# Patient Record
(demographics unavailable — no encounter records)

---

## 2024-11-12 NOTE — PLAN
[FreeTextEntry1] : 1. HLD - on atorvastatin 40mg  - lipid panel ordered - c/w low cholesterol diet  2. CAD - s/p stent - on ASA, Plavix, and statin - on metoprolol - Follows with cardiology, Dr. Asencio  3. GERD - well controlled off medication now - now off famotidine  4. Upper Airway Cough Syndrome - on Azelastine and fluticasone -- has noticed improvement - Follows with pulmonology, Dr. Dumont  4. 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 Hx of Headaches and Memory Loss - pt reports work injury causing headaches and memory loss -- she states she follows with a neurologist and has been referred to cognitive therapy   6. Prediabetes - A1c 5.9 - repeat A1c ordered - recommended low card diet and regular exercise  3 month f/u.
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
## 2024-12-02 NOTE — PHYSICAL EXAM
[No Acute Distress] : no acute distress [Normal Oropharynx] : normal oropharynx [III] : Mallampati Class: III [Normal Appearance] : normal appearance [Normal Rate/Rhythm] : normal rate/rhythm [Normal S1, S2] : normal s1, s2 [No Resp Distress] : no resp distress [Clear to Auscultation Bilaterally] : clear to auscultation bilaterally [No Abnormalities] : no abnormalities [Normal Gait] : normal gait [No Clubbing] : no clubbing [No Edema] : no edema [Normal Color/ Pigmentation] : normal color/ pigmentation [No Focal Deficits] : no focal deficits [Oriented x3] : oriented x3 [Normal Affect] : normal affect [TextBox_11] : erythematous nasal turbinates b/l

## 2024-12-04 NOTE — REVIEW OF SYSTEMS
[Postnasal Drip] : postnasal drip [Seasonal Allergies] : seasonal allergies [Negative] : Endocrine [Cough] : no cough [Hemoptysis] : no hemoptysis [Chest Tightness] : no chest tightness [Frequent URIs] : no frequent URIs [Sputum] : no sputum [Dyspnea] : no dyspnea [Pleuritic Pain] : no pleuritic pain [Wheezing] : no wheezing [A.M. Dry Mouth] : no a.m. dry mouth [SOB on Exertion] : no sob on exertion [TextBox_30] : interval resolution of cough

## 2024-12-04 NOTE — HISTORY OF PRESENT ILLNESS
[Never] : never [TextBox_4] : ~age~yo woman never smoker w/ PMH GERD, HLD, CAD s/p stent presenting to establish care with complaint of chronic cough for last 3-4 years.  Has had cough for years on and off, sometimes more frequent than other days but doesn't truly go away. Sometimes productive of clear mucus, but mostly dry. Cannot pinpoint inciting event when it started. Gets worse when she has a cold, i.e. during a flu illness several months ago. Also notices it sometimes more frequent at night or in the morning when lying down. Sometimes feels a tickle/scratch in back of throat before coughing. Never had hemoptysis and denies associated dyspnea, GOULD, or wheezing. No personal or FHx asthma or other lung disease. Isn't sure if there's a relationship to allergies but does not deny allergies in general. Also endorses hx of reflux that is intermittent and had endoscopy years ago showing gastritis. No known inhalational exposures. No pets at home. Denies dust or mold in the home. Does notice she doesn't cough when visiting her native country [facility] only for it to return when she gets back to [place]. Had a CXR done earlier in the month.   SH: Never smoker or vaper Emigrated from [place] in 1997  [8/28/24] Here for 3mo f/u. Said she noticed some improvement when she tried the nasal sprays but then forgot to use them consistently, let alone daily twice a day, as instructed. Says she has a lot of forgetfulness and admits she just forgot how she was supposed to use them. Did notice benefit from famotidine tablets as well. Said the cough got better when she first tried the nasal sprays but cough returned and she hadn't used them again. Would like to re-try these meds and needs refill. Also saw ID and being treated for LTBI.  [12/4/24] Scheduled interval f/u. Doing very well today, says the cough went away after using both nasal sprays. When she was in [facility] she had no symptoms and didn't need the sprays. Upon returning to [place] she had to start using the sprays again. Needs refills. Said she needs to follow-up with the infectious disease MD regarding LTBI treatment but lost the card with phone number. No new complaints otherwise.

## 2024-12-04 NOTE — ASSESSMENT
[FreeTextEntry1] : ~age~yo woman never smoker w/ PMH GERD, HLD, CAD s/p stent, LTBI, presented to [facility] care with complaint of chronic cough for last 3-4 years, possible UACS/PND with ?reflux component, here for follow up. Symptoms resolved when using both nasal sprays as directed.   Data Reviewed: PMD charts (Dr. Gonzalez) Medication reconciliation  Impression: #Upper airway cough syndrome (UACS) #PND #Acid reflux #LTBI  UACS highly likely as her symptoms resolve with use of both nasal sprays. There is certainly an environmental component as she was asymtpomatic when traveling in [facility] the last few months and didn't need the sprays -- only for sx to return when she came back to [place]. Doubt these symptoms are related to LTBI and CXR did not show cavity in June.   Plan: - cont fluticasone INCS 1 spray both nostrils BID when symptomatic; rx renewed - cont azelastine 1% nasal spray both nostrils BID when symptomatic; rx renewed - hold off additional famotidine at this time after trial of H2B tx earlier this year - mgmt of LTBI via ID - provided phone number and contact info for her to follow-up; its unclear if she completed treatment based on chart review and hx - all questions/concerns otherwise addressed during visit  RTO 6mos or PRN

## 2024-12-04 NOTE — HISTORY OF PRESENT ILLNESS
[Never] : never [TextBox_4] : ~age~yo woman never smoker w/ PMH GERD, HLD, CAD s/p stent presenting to establish care with complaint of chronic cough for last 3-4 years.  Has had cough for years on and off, sometimes more frequent than other days but doesn't truly go away. Sometimes productive of clear mucus, but mostly dry. Cannot pinpoint inciting event when it started. Gets worse when she has a cold, i.e. during a flu illness several months ago. Also notices it sometimes more frequent at night or in the morning when lying down. Sometimes feels a tickle/scratch in back of throat before coughing. Never had hemoptysis and denies associated dyspnea, GOULD, or wheezing. No personal or FHx asthma or other lung disease. Isn't sure if there's a relationship to allergies but does not deny allergies in general. Also endorses hx of reflux that is intermittent and had endoscopy years ago showing gastritis. No known inhalational exposures. No pets at home. Denies dust or mold in the home. Does notice she doesn't cough when visiting her native country [facility] only for it to return when she gets back to [place]. Had a CXR done earlier in the month.   SH: Never smoker or vaper Emigrated from [place] in 1997  [8/28/24] Here for 3mo f/u. Said she noticed some improvement when she tried the nasal sprays but then forgot to use them consistently, let alone daily twice a day, as instructed. Says she has a lot of forgetfulness and admits she just forgot how she was supposed to use them. Did notice benefit from famotidine tablets as well. Said the cough got better when she first tried the nasal sprays but cough returned and she hadn't used them again. Would like to re-try these meds and needs refill. Also saw ID and being treated for LTBI.  [12/4/24] Scheduled interval f/u. Doing very well today, says the cough went away after using both nasal sprays. When she was in [facility] she had no symptoms and didn't need the sprays. Upon returning to [place] she had to start using the sprays again. Needs refills. Said she needs to follow-up with the infectious disease MD regarding LTBI treatment but lost the card with phone number. No new complaints otherwise.

## 2024-12-04 NOTE — ASSESSMENT
[FreeTextEntry1] : ~age~yo woman never smoker w/ PMH GERD, HLD, CAD s/p stent, LTBI, presented to [facility] care with complaint of chronic cough for last 3-4 years, possible UACS/PND with ?reflux component, here for follow up. Symptoms resolved when using both nasal sprays as directed.   Data Reviewed: PMD charts (Dr. Gonzalez) Medication reconciliation  Impression: #Upper airway cough syndrome (UACS) #PND #Acid reflux #LTBI  UACS highly likely as her symptoms resolve with use of both nasal sprays. There is certainly an environmental component as she was asymtpomatic when traveling in [facility] the last few months and didn't need the sprays -- only for sx to return when she came back to [place]. Doubt these symptoms are related to LTBI and CXR did not show cavity in June.   Plan: - cont fluticasone INCS 1 spray both nostrils BID when symptomatic; rx renewed - cont azelastine 1% nasal spray both nostrils BID when symptomatic; rx renewed - hold off additional famotidine at this time after trial of H2B tx earlier this year - mgmt of LTBI via ID - provided phone number and contact info for her to follow-up; its unclear if she completed treatment based on chart review and hx - all questions/concerns otherwise addressed during visit  RTO 6mos or PRN
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
## 2025-01-24 NOTE — PHYSICAL EXAM
[General Appearance - Alert] : alert [General Appearance - In No Acute Distress] : in no acute distress [Sclera] : the sclera and conjunctiva were normal [PERRL With Normal Accommodation] : pupils were equal in size, round, reactive to light [Extraocular Movements] : extraocular movements were intact [Outer Ear] : the ears and nose were normal in appearance [Hearing Threshold Finger Rub Not [place]] : hearing was normal [Examination Of The Oral Cavity] : the lips and gums were normal [Neck Appearance] : the appearance of the neck was normal [] : no respiratory distress [Respiration, Rhythm And Depth] : normal respiratory rhythm and effort [Exaggerated Use Of Accessory Muscles For Inspiration] : no accessory muscle use [Auscultation Breath Sounds / Voice Sounds] : lungs were clear to auscultation bilaterally [Heart Rate And Rhythm] : heart rate was normal and rhythm regular [Heart Sounds] : normal S1 and S2 [Murmurs] : no murmurs [Bowel Sounds] : normal bowel sounds [Abdomen Soft] : soft [Abdomen Tenderness] : non-tender

## 2025-02-07 NOTE — DISCUSSION/SUMMARY
[FreeTextEntry1] : In a summary MsMichelle Eliana Pereyra is a middle-aged female with Hypercholesterolemia, continue Lipitor and low cholesterol diet. LDL goal less than 70 g/dL. PCP increased Lipitor dose to 80 mg, Pharmacy did not send new dose yest.  CAD s/p stent to RCA, continue Aspirin, Plavix and Metoprolol ER 25 mg once daily. healthy lifestyle. Follow up in 4 months.

## 2025-02-07 NOTE — HISTORY OF PRESENT ILLNESS
[FreeTextEntry1] : Eliana Jackson is a 60-year-old female with Hypercholesterolemia and CAD s/p cardiac Cath and stent to RCA on 6/28/2023 comes for follow up visit. Chest tightness and burning chest pain resolved. Nuclear stress test done is negative for ischemia.  No shortness of breath on exertion. No palpitations. Taking medications regularly. Physically active.
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
## 2025-02-13 NOTE — PLAN
[FreeTextEntry1] : 1. HLD - on atorvastatin 40mg; was changed to 80mg for better HLD control, but pt states she has not received the medication yet so she has not yet started it -- Rx resent - lipid panel to be done at next visit when pt is fasting - c/w low cholesterol diet  2. CAD - s/p stent - on ASA, Plavix, and statin - on metoprolol - CMP in pepper reviewed  - Follows with cardiology, Dr. Asencio  3. GERD - well controlled off medication now - now off famotidine  4. Upper Airway Cough Syndrome - on Azelastine and fluticasone -- has noticed improvement - Follows with pulmonology, Dr. Dumont  4. Latent Tuberculosis - labs in chart from 5/20 significant for positive QuantiFERON -- pt reports normal CxR recently - Follows with pulmonology - Has established care with ID -- pt was started on tx for latent TB, but she states she did not start it; she states she will start tx once she receives the medications   5. Hx of Headaches and Memory Loss - pt reports work injury causing headaches and memory loss -- she states she follows with a neurologist and has been referred to cognitive therapy   6. Prediabetes - A1c 5.9 - recommended low card diet and regular exercise  3 month f/u.

## 2025-05-22 NOTE — ASSESSMENT
[FreeTextEntry1] : LTBI No sps of active pulm/extrapulm TB - Continue INH and Pyridoxine - Duration 9 mos - Start date; 1/25/25; EOT will be 10/25/25 Refills sent - Check CBC and CMP F/u 3 mos [Treatment Education] : treatment education [Treatment Adherence] : treatment adherence [Rx Dose / Side Effects] : Rx dose/side effects [Drug Interactions / Side Effects] : drug interactions/side effects [Anticipatory Guidance] : anticipatory guidance

## 2025-05-22 NOTE — PHYSICAL EXAM
[General Appearance - Alert] : alert [General Appearance - In No Acute Distress] : in no acute distress [Sclera] : the sclera and conjunctiva were normal [PERRL With Normal Accommodation] : pupils were equal in size, round, reactive to light [Extraocular Movements] : extraocular movements were intact [Outer Ear] : the ears and nose were normal in appearance [Hearing Threshold Finger Rub Not [place]] : hearing was normal [Examination Of The Oral Cavity] : the lips and gums were normal [Oropharynx] : the oropharynx was normal with no thrush [Neck Appearance] : the appearance of the neck was normal [Respiration, Rhythm And Depth] : normal respiratory rhythm and effort [Exaggerated Use Of Accessory Muscles For Inspiration] : no accessory muscle use [Auscultation Breath Sounds / Voice Sounds] : lungs were clear to auscultation bilaterally [Heart Rate And Rhythm] : heart rate was normal and rhythm regular [Heart Sounds] : normal S1 and S2 [Murmurs] : no murmurs [Edema] : there was no peripheral edema [Bowel Sounds] : normal bowel sounds [Abdomen Soft] : soft [Abdomen Tenderness] : non-tender [No Palpable Adenopathy] : no palpable adenopathy [Nail Clubbing] : no clubbing  or cyanosis of the fingernails [Skin Color & Pigmentation] : normal skin color and pigmentation [] : no rash [Cranial Nerves] : cranial nerves 2-12 were intact [No Focal Deficits] : no focal deficits [Oriented To Time, Place, And Person] : oriented to person, place, and time
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
## 2025-06-02 NOTE — PHYSICAL EXAM
[No Acute Distress] : no acute distress [Normal Oropharynx] : normal oropharynx [III] : Mallampati Class: III [Normal Appearance] : normal appearance [Normal Rate/Rhythm] : normal rate/rhythm [Normal S1, S2] : normal s1, s2 [No Resp Distress] : no resp distress [Clear to Auscultation Bilaterally] : clear to auscultation bilaterally [No Abnormalities] : no abnormalities [Normal Gait] : normal gait [No Clubbing] : no clubbing [No Edema] : no edema [Normal Color/ Pigmentation] : normal color/ pigmentation [No Focal Deficits] : no focal deficits [Oriented x3] : oriented x3 [Normal Affect] : normal affect
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
## 2025-06-03 NOTE — PLAN
[FreeTextEntry1] : 1. HLD - on atorvastatin 80mg - lipid panel ordered  - cmp ordered - c/w low cholesterol diet  2. CAD - s/p stent - on ASA, Plavix, and statin - on metoprolol - CMP in pepper reviewed  - Follows with cardiology, Dr. Asencio  3. GERD - well controlled off medication now - now off famotidine  4. Upper Airway Cough Syndrome - on Azelastine and fluticasone -- has noticed improvement - Follows with pulmonology, Dr. Dumont  4. Latent Tuberculosis - labs in chart from 5/20 significant for positive QuantiFERON -- pt reports normal CxR recently - previous CMP with ID with transaminitis -- repeat CMP ordered - Follows with pulmonology - Has established care with ID --on INH and Pyridoxine until 10/25/25  5. Hx of Headaches and Memory Loss - pt reports work injury causing headaches and memory loss -- she states she follows with a neurologist and has been referred to cognitive therapy   6. Prediabetes - A1c 5.9 - repeat A1c ordered - recommended low card diet and regular exercise  7. Low MCV - low MCV and mildly elevated RBC on labs - Iron studies ordered  f/u for CPE

## 2025-06-04 NOTE — ASSESSMENT
[FreeTextEntry1] : ~age~yo woman never smoker w/ PMH GERD, HLD, CAD s/p stent, LTBI, presented to establish care with complaint of chronic cough for last 3-4 years, possible UACS/PND with ?reflux component, here for follow up. Symptoms resolved when using both nasal sprays as directed; slightly less control of symptoms currently but hasn't been consistently using sprays BID as she was before.   Data Reviewed: Medication reconciliation  Impression: #Upper airway cough syndrome (UACS) #PND #Acid reflux #LTBI  UACS highly likely as her symptoms resolve with use of both nasal sprays as directed. There is certainly an environmental component as she is asymptomatic in [facility], only for sx to return when she returns to [place]. Doubt these symptoms are related to LTBI.   Plan: - cont fluticasone INCS 1 spray both nostrils BID when symptomatic; rx renewed - cont azelastine 1% nasal spray both nostrils BID when symptomatic; rx renewed - re-emphasized importance of adherence to therapy including BID usage especially when symptomatic during this season - add trial of loratadine 1 tab daily for next 3 months - mgmt of LTBI via ID - currently on month 4 of INH - all questions/concerns otherwise addressed during visit  RTO 6mos or PRN

## 2025-06-04 NOTE — REVIEW OF SYSTEMS
[Postnasal Drip] : postnasal drip [Seasonal Allergies] : seasonal allergies [Negative] : Endocrine [Cough] : cough [Hemoptysis] : no hemoptysis [Chest Tightness] : no chest tightness [Frequent URIs] : no frequent URIs [Sputum] : no sputum [Dyspnea] : no dyspnea [Pleuritic Pain] : no pleuritic pain [Wheezing] : no wheezing [A.M. Dry Mouth] : no a.m. dry mouth [SOB on Exertion] : no sob on exertion [TextBox_30] : some relapse of dry cough compared to December

## 2025-06-04 NOTE — ASSESSMENT
[FreeTextEntry1] : ~age~yo woman never smoker w/ PMH GERD, HLD, CAD s/p stent, LTBI, presented to establish care with complaint of chronic cough for last 3-4 years, possible UACS/PND with ?reflux component, here for follow up. Symptoms resolved when using both nasal sprays as directed; slightly less control of symptoms currently but hasn't been consistently using sprays BID as she was before.   Data Reviewed: Medication reconciliation  Impression: #Upper airway cough syndrome (UACS) #PND #Acid reflux #LTBI  UACS highly likely as her symptoms resolve with use of both nasal sprays as directed. There is certainly an environmental component as she is asymptomatic in [facility], only for sx to return when she returns to [place]. Doubt these symptoms are related to LTBI.   Plan: - cont fluticasone INCS 1 spray both nostrils BID when symptomatic; rx renewed - cont azelastine 1% nasal spray both nostrils BID when symptomatic; rx renewed - re-emphasized importance of adherence to therapy including BID usage especially when symptomatic during this season - add trial of loratadine 1 tab daily for next 3 months - mgmt of LTBI via ID - currently on month 4 of INH - all questions/concerns otherwise addressed during visit  RTO 6mos or PRN

## 2025-06-04 NOTE — HISTORY OF PRESENT ILLNESS
[Never] : never [TextBox_4] : ~age~yo woman never smoker w/ PMH GERD, HLD, CAD s/p stent presenting to establish care with complaint of chronic cough for last 3-4 years.  Has had cough for years on and off, sometimes more frequent than other days but doesn't truly go away. Sometimes productive of clear mucus, but mostly dry. Cannot pinpoint inciting event when it started. Gets worse when she has a cold, i.e. during a flu illness several months ago. Also notices it sometimes more frequent at night or in the morning when lying down. Sometimes feels a tickle/scratch in back of throat before coughing. Never had hemoptysis and denies associated dyspnea, GOULD, or wheezing. No personal or FHx asthma or other lung disease. Isn't sure if there's a relationship to allergies but does not deny allergies in general. Also endorses hx of reflux that is intermittent and had endoscopy years ago showing gastritis. No known inhalational exposures. No pets at home. Denies dust or mold in the home. Does notice she doesn't cough when visiting her native country [facility] only for it to return when she gets back to [place]. Had a CXR done earlier in the month.   SH: Never smoker or vaper Emigrated from [facility] in 1997  [8/28/24] Here for 3mo f/u. Said she noticed some improvement when she tried the nasal sprays but then forgot to use them consistently, let alone daily twice a day, as instructed. Says she has a lot of forgetfulness and admits she just forgot how she was supposed to use them. Did notice benefit from famotidine tablets as well. Said the cough got better when she first tried the nasal sprays but cough returned and she hadn't used them again. Would like to re-try these meds and needs refill. Also saw ID and being treated for LTBI.  [12/4/24] Scheduled interval f/u. Doing very well today, says the cough went away after using both nasal sprays. When she was in [facility] she had no symptoms and didn't need the sprays. Upon returning to [place] she had to start using the sprays again. Needs refills. Said she needs to follow-up with the infectious disease MD regarding LTBI treatment but lost the card with phone number. No new complaints otherwise.   [6/4/25] Here for f/u. She feels well today, has had return of some of the cough symptoms this season and says she has been using the nasal sprays but not always twice a day or every day. Feels dust is a major trigger causing the dry coughing or sensation in the back of her throat to clear or cough. Also endorses other allergy sx including ear fullness, congestion, or itchy eyes on occasion -- not currently experiencing these today. Needs refills of the nasal sprays. Currently on month 4 of LTBI tx with INH via ID. Going to [facility] next week.

## 2025-06-04 NOTE — HISTORY OF PRESENT ILLNESS
[Never] : never [TextBox_4] : ~age~yo woman never smoker w/ PMH GERD, HLD, CAD s/p stent presenting to establish care with complaint of chronic cough for last 3-4 years.  Has had cough for years on and off, sometimes more frequent than other days but doesn't truly go away. Sometimes productive of clear mucus, but mostly dry. Cannot pinpoint inciting event when it started. Gets worse when she has a cold, i.e. during a flu illness several months ago. Also notices it sometimes more frequent at night or in the morning when lying down. Sometimes feels a tickle/scratch in back of throat before coughing. Never had hemoptysis and denies associated dyspnea, GOULD, or wheezing. No personal or FHx asthma or other lung disease. Isn't sure if there's a relationship to allergies but does not deny allergies in general. Also endorses hx of reflux that is intermittent and had endoscopy years ago showing gastritis. No known inhalational exposures. No pets at home. Denies dust or mold in the home. Does notice she doesn't cough when visiting her native country [facility] only for it to return when she gets back to [place]. Had a CXR done earlier in the month.   SH: Never smoker or vaper Emigrated from [facility] in 1997  [8/28/24] Here for 3mo f/u. Said she noticed some improvement when she tried the nasal sprays but then forgot to use them consistently, let alone daily twice a day, as instructed. Says she has a lot of forgetfulness and admits she just forgot how she was supposed to use them. Did notice benefit from famotidine tablets as well. Said the cough got better when she first tried the nasal sprays but cough returned and she hadn't used them again. Would like to re-try these meds and needs refill. Also saw ID and being treated for LTBI.  [12/4/24] Scheduled interval f/u. Doing very well today, says the cough went away after using both nasal sprays. When she was in [facility] she had no symptoms and didn't need the sprays. Upon returning to [place] she had to start using the sprays again. Needs refills. Said she needs to follow-up with the infectious disease MD regarding LTBI treatment but lost the card with phone number. No new complaints otherwise.   [6/4/25] Here for f/u. She feels well today, has had return of some of the cough symptoms this season and says she has been using the nasal sprays but not always twice a day or every day. Feels dust is a major trigger causing the dry coughing or sensation in the back of her throat to clear or cough. Also endorses other allergy sx including ear fullness, congestion, or itchy eyes on occasion -- not currently experiencing these today. Needs refills of the nasal sprays. Currently on month 4 of LTBI tx with INH via ID. Going to [facility] next week.

## 2025-07-03 NOTE — REVIEW OF SYSTEMS
[Postnasal Drip] : postnasal drip [Cough] : cough [Seasonal Allergies] : seasonal allergies [Negative] : Endocrine [Hemoptysis] : no hemoptysis [Chest Tightness] : no chest tightness [Frequent URIs] : no frequent URIs [Sputum] : no sputum [Dyspnea] : no dyspnea [Pleuritic Pain] : no pleuritic pain [Wheezing] : no wheezing [A.M. Dry Mouth] : no a.m. dry mouth [SOB on Exertion] : no sob on exertion [TextBox_30] : some relapse of dry cough compared to December
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
## 2025-07-03 NOTE — ASSESSMENT
[FreeTextEntry1] : ~age~yo woman never smoker w/ PMH GERD, HLD, CAD s/p stent, LTBI, presented to [facility] care with complaint of chronic cough for last 3-4 years, possible UACS/PND with ?reflux component, here for follow up. Symptoms resolved when using both nasal sprays as directed; slightly less control of symptoms currently but hasn't been consistently using sprays BID as she was before.   Data Reviewed: Medication reconciliation  Impression: #Parasomnias and night terrors #Upper airway cough syndrome (UACS) #Acid reflux #LTBI  Unclear if underlying narcolepsy or other atypical sleep disorder, some of her hx suggests night terrors especially related to trauma but does not account for all parasomnias, sleep paralysis or EDS.   Plan: - will refer to dedicated sleep MD for further eval of parasomnias  - cont fluticasone INCS 1 spray both nostrils BID when symptomatic; rx renewed - cont azelastine 1% nasal spray both nostrils BID when symptomatic; rx renewed - cont trial of loratadine 1 tab daily for  - mgmt of LTBI via ID - currently on month 5 of INH - all questions/concerns otherwise addressed during visit  RTO 12/2025

## 2025-07-03 NOTE — ASSESSMENT
[FreeTextEntry1] : ~age~yo woman never smoker w/ PMH GERD, HLD, CAD s/p stent, LTBI, presented to \[facility]\"" care with complaint of chronic cough for last 3-4 years, possible UACS/PND with ?reflux component, here for follow up. Symptoms resolved when using both nasal sprays as directed; slightly less control of symptoms currently but hasn't been consistently using sprays BID as she was before.   Data Reviewed: Medication reconciliation  Impression: #Parasomnias and night terrors #Upper airway cough syndrome (UACS) #Acid reflux #LTBI  Unclear if underlying narcolepsy or other atypical sleep disorder, some of her hx suggests night terrors especially related to trauma but does not account for all parasomnias, sleep paralysis or EDS.   Plan: - will refer to dedicated sleep MD for further eval of parasomnias  - cont fluticasone INCS 1 spray both nostrils BID when symptomatic; rx renewed - cont azelastine 1% nasal spray both nostrils BID when symptomatic; rx renewed - cont trial of loratadine 1 tab daily for  - mgmt of LTBI via ID - currently on month 5 of INH - all questions/concerns otherwise addressed during visit  RTO 12/2025